# Patient Record
Sex: MALE | Race: WHITE
[De-identification: names, ages, dates, MRNs, and addresses within clinical notes are randomized per-mention and may not be internally consistent; named-entity substitution may affect disease eponyms.]

---

## 2019-10-24 ENCOUNTER — HOSPITAL ENCOUNTER (EMERGENCY)
Dept: HOSPITAL 25 - UCCORT | Age: 49
Discharge: HOME | End: 2019-10-24
Payer: SELF-PAY

## 2019-10-24 VITALS — DIASTOLIC BLOOD PRESSURE: 81 MMHG | SYSTOLIC BLOOD PRESSURE: 146 MMHG

## 2019-10-24 DIAGNOSIS — F17.210: ICD-10-CM
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
## 2019-10-24 DIAGNOSIS — M25.521: ICD-10-CM

## 2019-10-24 DIAGNOSIS — S54.21XA: Primary | ICD-10-CM

## 2019-10-24 PROCEDURE — 99202 OFFICE O/P NEW SF 15 MIN: CPT

## 2019-10-24 PROCEDURE — G0463 HOSPITAL OUTPT CLINIC VISIT: HCPCS

## 2019-10-24 NOTE — UC
Upper Extremity HPI





- HPI Summary


HPI Summary: 


49-year-old male comes in with a chief complaint of right arm pain numbness 

weakness.  Patient was at work and several boxes fell onto his right arm.  It 

swelling at the proximal right forearm into the elbow.  The swelling is gone 

down is continuing to have pain in that area and when he attempts to lift 

without arm it increases the pain in the forearm and into the right elbow.  

Also he is having weakness in his right hand .  It's mostly focused in the 

radial nerve distribution.  Denies any new shoulder pain or neck pain.  Report 

that he has chronic shoulder pain.  Taken ibuprofen says is not helping.





- History of Current Complaint


Chief Complaint: UCUpperExtremity


Stated Complaint: WC-RT ARM INJURY


Time Seen by Provider: 10/24/19 12:52


Pain Intensity: 4





- Allergies/Home Medications


Allergies/Adverse Reactions: 


 Allergies











Allergy/AdvReac Type Severity Reaction Status Date / Time


 


aspirin Allergy  Bleeding Verified 10/24/19 12:42














PMH/Surg Hx/FS Hx/Imm Hx


Previously Healthy: Yes





- Surgical History


Surgical History: Yes


Surgery Procedure, Year, and Place: metal fragment removed from left knee





- Family History


Known Family History: Positive: Non-Contributory





- Social History


Alcohol Use: None


Substance Use Type: None


Smoking Status (MU): Heavy Every Day Tobacco Smoker


Type: Cigarettes





Review of Systems


All Other Systems Reviewed And Are Negative: Yes


Constitutional: Positive: Negative


Skin: Positive: Negative


Eyes: Positive: Negative


ENT: Positive: Negative


Respiratory: Positive: Negative


Cardiovascular: Positive: Negative


Gastrointestinal: Positive: Negative


Motor: Positive: Other - see hpi


Neurovascular: Positive: Other - see hpi


Musculoskeletal: Positive: Other: - see hpi


Neurological: Positive: Other - see hpi


Psychological: Positive: Negative


Is Patient Immunocompromised?: No





Physical Exam


Triage Information Reviewed: Yes


Appearance: Well-Appearing, No Pain Distress, Well-Nourished


Vital Signs: 


 Initial Vital Signs











Temp  98.7 F   10/24/19 12:36


 


Pulse  92   10/24/19 12:36


 


Resp  16   10/24/19 12:36


 


BP  146/81   10/24/19 12:36


 


Pulse Ox  96   10/24/19 12:36











Vital Signs Reviewed: Yes


Eye Exam: Normal


Eyes: Positive: Conjunctiva Clear


Neck: Positive: Supple, Nontender


Respiratory: Positive: No respiratory distress


Musculoskeletal: Positive: Other: - Patient's tender to palpation in the 

proximal radial aspect of the right elbow.  Normal radial pulses bilaterally.  

Normal capillary refill both arms.  Hand  on the right is weaker than hand 

 on the left.  There is no wrist drop on examination and no decreased 

strength with wrist extension however it does increase his pain. Patient 

reports it's in the thumb and second third fourth fingers where he is most week.


Neurological: Positive: Alert


Psychological: Positive: Age Appropriate Behavior


Skin Exam: Normal





Upper Extremity Course/Dx





- Course


Course Of Treatment: 





: Narendra Pelayo F ([id number]) : NUANCE, (

NUANCE) Report Date: 10/24/2019 13:07:00 Report Status: Final ======

======================================== Start of Report Content ===============

===============================  Patient Name: RODRÍGUEZ JUAREZ Medical Record#

: [id number] Ordering Physician: Valdez Mazariegos MD Acct.#: [id number] : 

1970 Age: 49 Sex: M Location: URGENT CARE [facility] Exam Date: 10/24/19 

1307 ADM Status: REG ER Order Information: ELBOW RIGHT 3+ VWS Accession Number: 

[id number] CPT: 33798 INDICATION: Right elbow injury. TECHNIQUE: 4 views of 

the right elbow were obtained. FINDINGS: The bones are in normal alignment. No 

joint effusion or fracture is seen. Joint spaces appear maintained. There is a 

small linear area of calcification posterior to olecranon process in the region 

of the triceps tendon insertion. IMPRESSION: 1. NO EVIDENCE FOR FRACTURE. 2. 

FINDINGS SUGGESTIVE OF CALCIFIC TENDINITIS. ____________________________________

________________________ <Electronically signed by Narendra Pelayo MD in OV> 10/

24/19 1322 Dictated By: Narendra Pelayo MD Dictated Date/Time: 10/24/19 132 

Transcribed Date/Time: 10/24/19 1320 Copy to: CC:No Primary Care Phys,NOPCP ; 

Valdez Mazariegos MD Imaging - [facility] Imaging - [facility] 

- [facility] [address] [facility] [address] ph [phone]) ph [phone]) ph [phone])   ============================================== 

End of Report Content ==============================================





I discussed the x-rays with the patient.  Patient does not have a wrist drop.  

He does have increased pain with right wrist extension.  Does have a slightly 

less strong  on the right.  Will continue with naproxen 500 mg by mouth 

twice a day on a scheduled basis to avoid overuse of NSAIDs.  Also patient 

requests something stronger for pain and I prescribed Percocet total #15.  Can 

ice the area and follow-up with orthopedics.





- Differential Dx/Diagnosis


Provider Diagnosis: 


 Elbow pain, right, Radial nerve injury








Discharge ED





- Sign-Out/Discharge


Documenting (check all that apply): Patient Departure


All imaging exams completed and their final reports reviewed: No Studies





- Discharge Plan


Condition: Stable


Disposition: HOME


Prescriptions: 


Naproxen [Naproxen 500 mg tab] 500 mg PO BID #20 tablet


oxyCODONE/Acetamin 5/325 MG* [Percocet 5/325 TAB*] 1 tab PO Q6H PRN #15 tab MDD 

4


 PRN Reason: Pain - Moderate


Patient Education Materials:  Elbow Sprain (ED), Radial Nerve Palsy (ED)


Referrals: 


Marcio Severino MD [Medical Doctor] - 


Additional Instructions: 


FOLLOW UP WITH DR SEVERINO, ORTHOPEDICS.


GET REEVALUATED SOONER IF NOT IMPROVING OR YOUR CONDITION WORSENS OR ANY 

QUESTIONS OR CONCERNS.








- Billing Disposition and Condition


Condition: STABLE


Disposition: Home 5

## 2020-01-02 ENCOUNTER — HOSPITAL ENCOUNTER (EMERGENCY)
Dept: HOSPITAL 25 - UCCORT | Age: 50
Discharge: HOME | End: 2020-01-02
Payer: SELF-PAY

## 2020-01-02 VITALS — DIASTOLIC BLOOD PRESSURE: 83 MMHG | SYSTOLIC BLOOD PRESSURE: 139 MMHG

## 2020-01-02 DIAGNOSIS — J20.9: Primary | ICD-10-CM

## 2020-01-02 DIAGNOSIS — Z91.030: ICD-10-CM

## 2020-01-02 DIAGNOSIS — F17.210: ICD-10-CM

## 2020-01-02 DIAGNOSIS — Z88.6: ICD-10-CM

## 2020-01-02 PROCEDURE — 99203 OFFICE O/P NEW LOW 30 MIN: CPT

## 2020-01-02 PROCEDURE — 71046 X-RAY EXAM CHEST 2 VIEWS: CPT

## 2020-01-02 PROCEDURE — G0463 HOSPITAL OUTPT CLINIC VISIT: HCPCS

## 2020-01-02 NOTE — UC
Respiratory Complaint HPI





- HPI Summary


HPI Summary: 


49-year-old male presenting with friend for complaint of coughing since around 

Thanksgiving.  Nonproductive cough.  Notes worsening wheezing and shortness of 

breath began.  Patient believes that it is from using a propane cooking stove 

and kerosene space heater for heat after that heating system in his home broke.

  Patient states that coughing is relieved when he leaves the house and that he 

thinks the coughing is "caused by carbon monoxide."  Denies lightheadedness, 

dizziness, nausea, LOC and headaches. States others in the house do not have 

similar symptoms. Notes taking over-the-counter cough medications without 

relief.  Denies hearing chills.  Denies nausea or vomiting.  Patient is a 

current heavy every day smoker for the past 41 years.





- History of Current Complaint


Chief Complaint: UCRespiratory


Stated Complaint: COUGH


Hx Obtained From: Patient


Pain Intensity: 0





- Allergies/Home Medications


Allergies/Adverse Reactions: 


 Allergies











Allergy/AdvReac Type Severity Reaction Status Date / Time


 


aspirin Allergy  Bleeding Verified 10/24/19 12:42


 


bees Allergy  flu sx and Uncoded 01/02/20 15:54





   anaphylaxis  











Home Medications: 


 Home Medications





Naproxen [Naproxen 500 mg tab] 500 mg PO BID PRN 01/02/20 [History Confirmed 01/ 02/20]


diPHENhydraMINE PO* [Benadryl PO 25 MG TAB*] 50 mg PO DAILY PRN 01/02/20 [

History Confirmed 01/02/20]











PMH/Surg Hx/FS Hx/Imm Hx





- Surgical History


Surgical History: Yes


Surgery Procedure, Year, and Place: metal fragment removed from left knee





- Family History


Known Family History: Positive: Non-Contributory





- Social History


Alcohol Use: None


Substance Use Type: None


Smoking Status (MU): Heavy Every Day Tobacco Smoker


Type: Cigarettes


Amount Used/How Often: 1/2 to 3/4 ppd 





Review of Systems


All Other Systems Reviewed And Are Negative: Yes


Constitutional: Positive: Negative.  Negative: Fever, Chills, Fatigue


ENT: Positive: Negative


Respiratory: Positive: Shortness Of Breath, Cough - nonproductive, Other - 

wheezing


Cardiovascular: Positive: Negative.  Negative: Chest Pain


Gastrointestinal: Positive: Negative.  Negative: Vomiting, Nausea


Musculoskeletal: Positive: Negative


Neurological: Negative: Headache





Physical Exam


Triage Information Reviewed: Yes


Appearance: No Pain Distress, Well-Nourished


Vital Signs: 


 Initial Vital Signs











Temp  98.8 F   01/02/20 15:45


 


Pulse  84   01/02/20 15:45


 


Resp  18   01/02/20 15:45


 


BP  139/83   01/02/20 15:45


 


Pulse Ox  97   01/02/20 15:45











Vital Signs Reviewed: Yes


Eyes: Positive: Conjunctiva Clear


ENT: Positive: Hearing grossly normal, Pharynx normal, TMs normal.  Negative: 

Pharyngeal erythema


Neck exam: Normal


Neck: Positive: Supple, Nontender, No Lymphadenopathy


Respiratory: Positive: No respiratory distress, No accessory muscle use, 

Wheezing - diffuse b/l wheezing throughout lung fields, Expiration.  Negative: 

Crackles, Rhonchi, Stridor


Cardiovascular Exam: Normal


Cardiovascular: Positive: RRR


Neurological: Positive: Alert


Psychological: Positive: Age Appropriate Behavior





Diagnostics





- Radiology


  ** chest


Radiology Interpretation Completed By: Radiologist


Summary of Radiographic Findings: FINDINGS: The heart is within normal limits 

in size. Mediastinal and hilar contours appear within normal limits. The lungs 

are clear. No pleural effusion is seen. IMPRESSION: NO EVIDENCE FOR ACTIVE 

CARDIOPULMONARY DISEASE.





Respiratory Course/Dx





- Course


Course Of Treatment: 


Patient presenting with cough since thanksgiving. VS normal. Diffuse b/l 

wheezing noted on exam. No respiratory distress. CXR without evidence of 

cardiopulmonary disease. I treated patient with inhaler and tessalon perles. 

Patient declined prednisone treatment, stating he "already has insomnia."


Instructed to follow up with the physician referral for annual physical and 

reevaluation of symptoms. Instructed to go to ED with any new or worsening 

symptoms. Patient states he called  today and they are currently 

working on heat in house getting fixed. Patient voiced understanding and agreed 

with treatment plan.





- Differential Dx/Diagnosis


Provider Diagnosis: 


 Acute bronchitis with bronchospasm








Discharge ED





- Sign-Out/Discharge


Documenting (check all that apply): Patient Departure


All imaging exams completed and their final reports reviewed: Yes





- Discharge Plan


Condition: Stable


Disposition: HOME


Prescriptions: 


Benzonatate CAP* [Tessalon 100 MG CAP*] 100 mg PO TID PRN #15 cap


 PRN Reason: Cough


Patient Education Materials:  Acute Bronchitis (ED), Bronchospasm (ED)


Referrals: 


[facility] PHYSICIAN REFERRAL [Outside] - As Soon As Possible


Additional Instructions: 


As discussed, your chest xray did not reveal any abnormalities.


Use the inhaler as needed for relief of shortness of breath and wheezing.


You may take the tesslon perles for relief of coughing. You may also continue 

with your over the counter cough medication as directed.


Make sure you get plenty of rest and fluids. 


It is strongly recommended that you follow up with the primary care referral 

listed below for annual physical examination and for recheck of symptoms.


Go to the emergency room with any new or worsening symptoms, including fever, 

difficulty breathing, and nausea and vomiting.





- Billing Disposition and Condition


Condition: STABLE


Disposition: Home





- Attestation Statements


Provider Attestation: 





Patient not seen by me


I was available for consult


Chart reviewed


DRAKE

## 2020-01-02 NOTE — XMS REPORT
Continuity of Care Document (CCD)

 Created on:2019



Patient:Herman Cyr

Sex:Male

:1970

External Reference #:[id number]





Demographics







 Address  [address] [place] [address]

 

 Mobile Phone  [phone]

 

 Preferred Language  en

 

 Marital Status  Not  or 

 

 [employer] Affiliation  Unknown

 

 Race  White

 

 Ethnic Group  Not  or 









Author







 Name  Marcio Severino MD (transmitted by agent of provider Maxim Wynn)

 

 Address  [address] [place] [address] [place] [address]









Care Team Providers







 Name  Role  Phone

 

 Patient's Choice  Care Team Information   Unavailable









Problems







 Description

 

 No Information Available







Social History







 Type  Date  Description  Comments

 

 Birth Sex    Unknown  

 

 Tobacco Use  Start: Unknown  Light tobacco smoker (10 or fewer  



     cigarettes/day)  

 

 ETOH Use    Denies alcohol use  

 

 Tobacco Use  Start: Unknown  Patient is a current smoker, smokes  



     every day  

 

 Recreational Drug Use    Denies Drug Use  

 

 Exercise Type/Frequency    Exercises regularly  







Allergies, Adverse Reactions, Alerts







 Active Allergies  Reaction  Severity  Comments  Date

 

 Aspirin        2019







Medications







 Active Medications  SIG  Qnty  Indications  Ordering Provider  Date

 

 Aleve  1 tab by mouth      Unknown  



    220mg Tablets  twice daily as        



   needed        

 

 Tylenol  2 tablets every 4      Unknown  



      325mg Capsules  hours as needed        



   for pain        







Immunizations







 Description

 

 No Information Available







Vital Signs







 Date  Vital  Result  Comment

 

 2019 10:01am  Height  68 inches  5'8"









 Heart Rate  103 /min  

 

 BP Systolic Sitting  128 mmHg  

 

 BP Diastolic Sitting  88 mmHg  

 

 Respiratory Rate  16 /min  

 

 O2 % BldC Oximetry  95 %  







Results







 Description

 

 No Information Available







Procedures







 Date  Code  Description  Status

 

 2019  55204  Injection Single Tendon Origin/Insertion  Completed







Medical Devices







 Description

 

 No Information Available







Encounters







 Type  Date  Location  Provider  Dx  Diagnosis

 

 Office Visit  2019  [facility]  OSBALDO [id number]  Lateral



   9:15a  at [place]  MD    epicondylitis,



           right elbow









 S44.21xA  Injury of radial nerve at upper arm level, right arm, init







Assessments







 Date  Code  Description  Provider

 

 2019  M77.11  Lateral epicondylitis, right elbow  Marcio Severino MD

 

 2019  S44.21xA  Injury of radial nerve at upper arm level,  Marcio Severino MD



     right arm, initial encounter  







Plan of Treatment

Future Appointment(s):2019  9:30 am - [facility] - Marcio Severino, MDM77.11 Lateral 
epicondylitis, right elbowNew Therapy:Physical TherapyFollow up:Follow up:   3 
ysijpW56.21xA Injury of radial nerve at upper arm level, right arm, initial 
encounterNew Therapy:Physical Therapy



Functional Status







 Description

 

 No Information Available







Mental Status







 Description

 

 No Information Available







Referrals







 Description

 

 No Information Available